# Patient Record
(demographics unavailable — no encounter records)

---

## 2025-01-13 NOTE — HISTORY OF PRESENT ILLNESS
[FreeTextEntry1] : 67 y.o F w/ PMHx of HTN, HLD, CAC score 38, atrial flutter, AF s/p ablation 11/2023 (on eliquis), obesity, presented to the office for follow up.  She reports feeling well since our last visit. No episodes of symptomatic afib or alerts noted on apple watch. Follows with EP Dr. Tran, still taking eliquis. Reports adherence to all meds. She denies exertional chest pain, dyspnea, syncope or presyncope. No orthopnea or PND. Has been working out.   ======================================================================================= July 2024 lipids  HDL 51 LDL 66 ; a1c 5.9% 12/2023: eGFR 95  07/2023: , HDL 50, LDL 75,  a1c 5.7% 9/12/22:   HDL 59 LDL 63 A1c 5.3% Cr 0.78 Lipid panel: June 2020- tot- 143, trig- 118, LDL- 65, HDL- 5, K- 3.4  April 2019 Lp(a) <10 nmol/L  10/2023 TTE - nl LV & RV size & fx, mild LVH, indeterminate LV diastolic fx, mild MR/TR, no pericardial effusion  05/19 CAC: 38 (LAD 6, LCx 32)  Stress Test: 11/9/17, Pt exercised for 9:01 mins on the Cornelius protocol from a HR of 75 bpm to a peak HR of 157 bpm, representing 98% of max predicted HR. BP oswald from 132/84 to 160/70 with a DWP of 25,120. Exercise capacity is 10 METS, which is excellent for age and gender. Baseline EKG showed NSR. There were no significant ST segment changes or arrhythmias with exercise. The EKG was negative for ischemia. At baseline the echo showed an EF of 60-65%. With exercise, there was normal augmentation in left ventricular systolic function. The stress echo was normal with no evidence of inducible ischemia.  Echo: 11/9/17, 1. Normal mitral valve. Minimal mitral regurgitation.2. Normal trileaflet aortic valve.3. Aortic Root: 3.1 cm. LVOT diameter: 2 cm.4. Normal left atrium. LA volume index = 29 cc/m2.5. Mild to moderate concentric left ventricular hypertrophy.6. Normal left ventricular systolic function, estimated LVEF of 60-65%. No segmental wall motion abnormalities.7. Normal diastolic function8. Normal right atrium.9. Normal right ventricular size and function.10. Normal tricuspid valve. Mild tricuspid regurgitation.11. Normal pulmonic valve.12. Normal pericardium with no pericardial effusion.

## 2025-01-13 NOTE — REASON FOR VISIT
[CV Risk Factors and Non-Cardiac Disease] : CV risk factors and non-cardiac disease [Hyperlipidemia] : hyperlipidemia [Hypertension] : hypertension [FreeTextEntry3] : Nathaly Cardenas

## 2025-01-13 NOTE — PHYSICAL EXAM
[Well Developed] : well developed [Well Nourished] : well nourished [No Acute Distress] : no acute distress [Normal Venous Pressure] : normal venous pressure [No Carotid Bruit] : no carotid bruit [Normal S1, S2] : normal S1, S2 [No Murmur] : no murmur [Clear Lung Fields] : clear lung fields [Good Air Entry] : good air entry [No Respiratory Distress] : no respiratory distress  [Soft] : abdomen soft [Non Tender] : non-tender [Normal Bowel Sounds] : normal bowel sounds [Normal Gait] : normal gait [Normal] : no edema, no cyanosis, no clubbing, no varicosities [Venous varicosities] : venous varicosities [No Skin Lesions] : no skin lesions [Moves all extremities] : moves all extremities [Alert and Oriented] : alert and oriented [Normal memory] : normal memory [de-identified] : irregularly, irregular rhythm

## 2025-01-13 NOTE — PHYSICAL EXAM
[Well Developed] : well developed [Well Nourished] : well nourished [No Acute Distress] : no acute distress [Normal Venous Pressure] : normal venous pressure [No Carotid Bruit] : no carotid bruit [Normal S1, S2] : normal S1, S2 [No Murmur] : no murmur [Clear Lung Fields] : clear lung fields [Good Air Entry] : good air entry [No Respiratory Distress] : no respiratory distress  [Soft] : abdomen soft [Non Tender] : non-tender [Normal Bowel Sounds] : normal bowel sounds [Normal Gait] : normal gait [Normal] : no edema, no cyanosis, no clubbing, no varicosities [Venous varicosities] : venous varicosities [No Skin Lesions] : no skin lesions [Moves all extremities] : moves all extremities [Alert and Oriented] : alert and oriented [Normal memory] : normal memory [de-identified] : irregularly, irregular rhythm

## 2025-01-13 NOTE — DISCUSSION/SUMMARY
[FreeTextEntry1] : #Coronary calcification #HLD #ASCVD Risk reduction CAC 38 in 2019. LDL at goal <70mg/dL. Lp(a) normal.  - continue Crestor 20 mg daily  #Aflutter/AFib s/p ablation in 2023. Follows w/ Dr. Tran, seeing him next month. - c/w eliquis 5mg BID  #HTN BP at goal in office.  - Continue olmesartan 5mg and juan 25mg - She will continue to monitor BP at home as well  - getting BMP with her internist  #Mild MR #Mild TR #LVH Last TTE in Oct 2023.  - next TTE Oct 2025  #Obesity  #Prediabetes Has been losing weight through lifestyle changes.  - encouraged patient to continue healthy exercise and eating habits, focusing on a mostly plant-based diet, Mediterranean style of eating and aiming for the recommended 150 minutes per week of moderate physical activity   RTC 6 months.  [EKG obtained to assist in diagnosis and management of assessed problem(s)] : EKG obtained to assist in diagnosis and management of assessed problem(s)

## 2025-03-01 NOTE — HISTORY OF PRESENT ILLNESS
[FreeTextEntry1] : Ms. Arriaga is a pleasant 67-year-old female with hypertension, hyperlipidemia and paroxysmal atrial flutter and atrial fibrillation s/p ablation 11/2023, who presents for follow up.  She initially presented complaining of infrequent palpitations, several times per year. She presented to the ED in the past with fluttering and was found to be in AF. She was started on Eliquis, which she has been compliant with.  She has not tolerated beta blockers or calcium channel blockers in the past due to fatigue and peripheral edema.  She hadn't been seen from 2019 to 2023 when she was found to be in afib.   She ultimately underwent an ablation of atrial fibrillation and atrial flutter 11/2023.  She has had no further palpitations though has felt some extra beats,.      She denies dizziness, syncope, chest pain, shortness of breath, orthopnea, PND, change in exercise tolerance or lower extremity edema. She is compliant with Eliquis without issues.
Female

## 2025-03-01 NOTE — PHYSICAL EXAM
[General Appearance - Well Developed] : well developed [Normal Appearance] : normal appearance [Well Groomed] : well groomed [General Appearance - Well Nourished] : well nourished [No Deformities] : no deformities [General Appearance - In No Acute Distress] : no acute distress [Normal Conjunctiva] : the conjunctiva exhibited no abnormalities [Normal Oral Mucosa] : normal oral mucosa [Normal Jugular Venous V Waves Present] : normal jugular venous V waves present [] : no respiratory distress [Respiration, Rhythm And Depth] : normal respiratory rhythm and effort [Exaggerated Use Of Accessory Muscles For Inspiration] : no accessory muscle use [Auscultation Breath Sounds / Voice Sounds] : lungs were clear to auscultation bilaterally [Normal Rate] : normal [Rhythm Regular] : regular [Normal S1] : normal S1 [Normal S2] : normal S2 [Click] : no click [Abnormal Walk] : normal gait [Skin Color & Pigmentation] : normal skin color and pigmentation [Oriented To Time, Place, And Person] : oriented to person, place, and time [Impaired Insight] : insight and judgment were intact [Affect] : the affect was normal [Mood] : the mood was normal [No Anxiety] : not feeling anxious [Distant] : the heart sounds were ~L not distant

## 2025-03-01 NOTE — REVIEW OF SYSTEMS
[Palpitations] : no palpitations [Negative] : Heme/Lymph [Fever] : no fever [Weight Gain (___ Lbs)] : no recent weight gain [Chills] : no chills [Feeling Fatigued] : not feeling fatigued [Weight Loss (___ Lbs)] : no recent weight loss [SOB] : no shortness of breath [Dyspnea on exertion] : not dyspnea during exertion [Chest Discomfort] : no chest discomfort [Lower Ext Edema] : no extremity edema [Syncope] : no syncope

## 2025-03-01 NOTE — REASON FOR VISIT
[Arrhythmia/ECG Abnorrmalities] : arrhythmia/ECG abnormalities [Follow-Up - Clinic] : a clinic follow-up of [FreeTextEntry1] : atrial flutter

## 2025-03-01 NOTE — CARDIOLOGY SUMMARY
[No Ischemia] : no Ischemia [No Exercise Ind Arr] : no exercise induced arrhythmias [___] : [unfilled] [LVEF ___%] : LVEF [unfilled]% [Normal] : normal LA size [None] : no mitral regurgitation [de-identified] : 10/19/2023:  bpm 12/7/2023 sinus at 86bpm 2/8/24 sinus at 90bpm 8/8/24 sinus a 77bpm 2/26/25 sinus at 71bpm

## 2025-03-01 NOTE — ADDENDUM
[FreeTextEntry1] : I, Philip Tran, hereby attest that the medical record entry for this patient accurately reflects signatures/notations that I made on the Date of Service in my capacity as an Attending Physician when I treated/diagnosed the above patient. I do hereby attest that this information is true, accurate and complete to the best of my knowledge and I understand that any falsification, omission, or concealment of material fact may subject me to administrative, civil, or, criminal liability. I agree with the note as written by my PA in its entirety. I was present for the entire visit and supervised the entire visit and agree with the plan as outlined.  I, Shaw Flores, am scribing for and the presence of Dr. Tran the following sections: HPI, PMH,Family/social history, ROS, Physical Exam, Assessment / Plan.

## 2025-03-01 NOTE — DISCUSSION/SUMMARY
[EKG obtained to assist in diagnosis and management of assessed problem(s)] : EKG obtained to assist in diagnosis and management of assessed problem(s) [FreeTextEntry1] :  Ms. Arriaga is a pleasant 67-year-old female with hypertension, hyperlipidemia and paroxysmal atrial flutter and atrial fibrillation s/p ablation 11/2023, who presents for follow up.  She is in normal sinus rhythm today with no symptoms concerning for recurrent sustained arrhythmia.   We discussed her CHADSVASC score is at least 3 and the data shows she should be on a NOAC long term.  We discussed long term the option of monitoring with a loop recorder or wearable technology if she decides she wants to stop oral anticoagulation but at this time she will continue Eliquis.  Follow up in 6 months or sooner if needed.  She knows to call with any questions or concerns.

## 2025-03-01 NOTE — CARDIOLOGY SUMMARY
Patient Seen in: [facility] Emergency Department    History   Patient presents with:  Fever (infectious)  Urinary Symptoms (urologic)    Stated Complaint: fever, urine frequency, catheter removed yesterday    HPI    70-year-old male presents emergen HPI.  Constitutional and vital signs reviewed. All other systems reviewed and negative except as noted above.     Physical Exam     ED Triage Vitals [11/14/18 1955]   /77   Pulse 80   Resp 18   Temp 97.8 °F (36.6 °C)   Temp src Oral   SpO2 97 % [No Ischemia] : no Ischemia HGB 12.6 (*)     Neutrophil Absolute Prelim 10.89 (*)     Neutrophil Absolute 10.89 (*)     Monocyte Absolute 1.12 (*)     All other components within normal limits   LACTIC ACID, PLASMA - Normal   CBC WITH DIFFERENTIAL WITH PLATELET    Narrative:      The [No Exercise Ind Arr] : no exercise induced arrhythmias [___] : [unfilled] [LVEF ___%] : LVEF [unfilled]% [Normal] : normal LA size [None] : no mitral regurgitation [de-identified] : 10/19/2023:  bpm 12/7/2023 sinus at 86bpm 2/8/24 sinus at 90bpm 8/8/24 sinus a 77bpm 2/26/25 sinus at 71bpm

## 2025-03-01 NOTE — CARDIOLOGY SUMMARY
"  Problem: Pain  Goal: Patient's pain/discomfort is manageable  Outcome: Progressing     Problem: Daily Care  Goal: Daily care needs are met  Outcome: Progressing   Pain managed with medication. Weak, but attempts to aid in own ADLs. Sclera very red, slept less than 1 hour during this night. Anxious also calling very frequently and expressing loneliness. PRN vistaril x1 before bed but not much effect, declined further PRN and expressed anxiety about medications \"messing up my kidneys more\". Urine output still dark savita but lighter than earlier this shift.   " [No Ischemia] : no Ischemia [No Exercise Ind Arr] : no exercise induced arrhythmias [___] : [unfilled] [LVEF ___%] : LVEF [unfilled]% [Normal] : normal LA size [None] : no mitral regurgitation [de-identified] : 10/19/2023:  bpm 12/7/2023 sinus at 86bpm 2/8/24 sinus at 90bpm 8/8/24 sinus a 77bpm 2/26/25 sinus at 71bpm

## 2025-04-03 NOTE — REASON FOR VISIT
[CV Risk Factors and Non-Cardiac Disease] : CV risk factors and non-cardiac disease [Hyperlipidemia] : hyperlipidemia [Hypertension] : hypertension [FreeTextEntry3] : Dr. iL Rojas (fax [phone] attn: Arvind

## 2025-04-03 NOTE — HISTORY OF PRESENT ILLNESS
[FreeTextEntry1] : 67 y.o F w/ PMHx of HTN, HLD, CAC score 38, atrial flutter, AF s/p ablation 11/2023 (on eliquis), obesity, presented to the office for follow up.  She reports feeling well since our last visit. No episodes of symptomatic afib or alerts noted on apple watch. Follows with EP Dr. Tran, still taking eliquis. Reports adherence to all meds. She denies exertional chest pain, dyspnea, syncope or presyncope. No orthopnea or PND. Walks 3-5 miles/day. Reports she is having trouble losing weight. She is going for parathyroid surgery with Dr. Li Rojas at [place].  ======================================================================================= July 2024 lipids  HDL 51 LDL 66 ; a1c 5.9% 12/2023: eGFR 95  07/2023: , HDL 50, LDL 75,  a1c 5.7% 9/12/22:   HDL 59 LDL 63 A1c 5.3% Cr 0.78 Lipid panel: June 2020- tot- 143, trig- 118, LDL- 65, HDL- 5, K- 3.4  April 2019 Lp(a) <10 nmol/L  10/2023 TTE - nl LV & RV size & fx, mild LVH, indeterminate LV diastolic fx, mild MR/TR, no pericardial effusion  05/19 CAC: 38 (LAD 6, LCx 32)  Stress Test: 11/9/17, Pt exercised for 9:01 mins on the Cornelius protocol from a HR of 75 bpm to a peak HR of 157 bpm, representing 98% of max predicted HR. BP oswald from 132/84 to 160/70 with a DWP of 25,120. Exercise capacity is 10 METS, which is excellent for age and gender. Baseline EKG showed NSR. There were no significant ST segment changes or arrhythmias with exercise. The EKG was negative for ischemia. At baseline the echo showed an EF of 60-65%. With exercise, there was normal augmentation in left ventricular systolic function. The stress echo was normal with no evidence of inducible ischemia.  Echo: 11/9/17, 1. Normal mitral valve. Minimal mitral regurgitation.2. Normal trileaflet aortic valve.3. Aortic Root: 3.1 cm. LVOT diameter: 2 cm.4. Normal left atrium. LA volume index = 29 cc/m2.5. Mild to moderate concentric left ventricular hypertrophy.6. Normal left ventricular systolic function, estimated LVEF of 60-65%. No segmental wall motion abnormalities.7. Normal diastolic function8. Normal right atrium.9. Normal right ventricular size and function.10. Normal tricuspid valve. Mild tricuspid regurgitation.11. Normal pulmonic valve.12. Normal pericardium with no pericardial effusion.

## 2025-04-03 NOTE — PHYSICAL EXAM
[Well Developed] : well developed [Well Nourished] : well nourished [No Acute Distress] : no acute distress [Normal Venous Pressure] : normal venous pressure [No Carotid Bruit] : no carotid bruit [Normal S1, S2] : normal S1, S2 [No Murmur] : no murmur [Clear Lung Fields] : clear lung fields [Good Air Entry] : good air entry [No Respiratory Distress] : no respiratory distress  [Soft] : abdomen soft [Non Tender] : non-tender [Normal Bowel Sounds] : normal bowel sounds [Normal Gait] : normal gait [Normal] : no edema, no cyanosis, no clubbing, no varicosities [Venous varicosities] : venous varicosities [No Skin Lesions] : no skin lesions [Moves all extremities] : moves all extremities [Alert and Oriented] : alert and oriented [Normal memory] : normal memory [de-identified] : irregularly, irregular rhythm

## 2025-04-03 NOTE — REASON FOR VISIT
[CV Risk Factors and Non-Cardiac Disease] : CV risk factors and non-cardiac disease [Hyperlipidemia] : hyperlipidemia [Hypertension] : hypertension [FreeTextEntry3] : Dr. Li Rojas (fax [phone] attn: Arvind

## 2025-04-03 NOTE — HISTORY OF PRESENT ILLNESS
[FreeTextEntry1] : 67 y.o F w/ PMHx of HTN, HLD, CAC score 38, atrial flutter, AF s/p ablation 11/2023 (on eliquis), obesity, presented to the office for follow up.  She reports feeling well since our last visit. No episodes of symptomatic afib or alerts noted on apple watch. Follows with EP Dr. Tran, still taking eliquis. Reports adherence to all meds. She denies exertional chest pain, dyspnea, syncope or presyncope. No orthopnea or PND. Walks 3-5 miles/day. Reports she is having trouble losing weight. She is going for parathyroid surgery with Dr. Li Rojas at [place].  ======================================================================================= July 2024 lipids  HDL 51 LDL 66 ; a1c 5.9% 12/2023: eGFR 95  07/2023: , HDL 50, LDL 75,  a1c 5.7% 9/12/22:   HDL 59 LDL 63 A1c 5.3% Cr 0.78 Lipid panel: June 2020- tot- 143, trig- 118, LDL- 65, HDL- 5, K- 3.4  April 2019 Lp(a) <10 nmol/L  10/2023 TTE - nl LV & RV size & fx, mild LVH, indeterminate LV diastolic fx, mild MR/TR, no pericardial effusion  05/19 CAC: 38 (LAD 6, LCx 32)  Stress Test: 11/9/17, Pt exercised for 9:01 mins on the Cornelius protocol from a HR of 75 bpm to a peak HR of 157 bpm, representing 98% of max predicted HR. BP oswald from 132/84 to 160/70 with a DWP of 25,120. Exercise capacity is 10 METS, which is excellent for age and gender. Baseline EKG showed NSR. There were no significant ST segment changes or arrhythmias with exercise. The EKG was negative for ischemia. At baseline the echo showed an EF of 60-65%. With exercise, there was normal augmentation in left ventricular systolic function. The stress echo was normal with no evidence of inducible ischemia.  Echo: 11/9/17, 1. Normal mitral valve. Minimal mitral regurgitation.2. Normal trileaflet aortic valve.3. Aortic Root: 3.1 cm. LVOT diameter: 2 cm.4. Normal left atrium. LA volume index = 29 cc/m2.5. Mild to moderate concentric left ventricular hypertrophy.6. Normal left ventricular systolic function, estimated LVEF of 60-65%. No segmental wall motion abnormalities.7. Normal diastolic function8. Normal right atrium.9. Normal right ventricular size and function.10. Normal tricuspid valve. Mild tricuspid regurgitation.11. Normal pulmonic valve.12. Normal pericardium with no pericardial effusion.

## 2025-04-03 NOTE — DISCUSSION/SUMMARY
[FreeTextEntry1] : 67 y.o F w/ PMHx of HTN, HLD, CAC score 38, atrial flutter, AF s/p ablation 11/2023 (on eliquis), obesity, presented to the office for follow up.  #Preoperative risk evaluation  RCRI 0 points, METS >4. EKG performed in office.  - From a cardiovascular standpoint, she is optimized to proceed to surgery and no further testing is indicated - okay to hold eliquis for 72 hours prior to surgery  #Coronary calcification #HLD #ASCVD Risk reduction CAC 38 in 2019. LDL at goal <70mg/dL. Lp(a) normal.  - continue Crestor 20 mg daily  #Aflutter/AFib s/p ablation in 2023. Follows w/ Dr. Tran, seeing him next month. - c/w eliquis 5mg BID  #HTN BP at goal in office. Olmesartan discontinued due to episodes of low BP.  - Continue juan 25mg - She will continue to monitor BP at home as well   #Mild MR #Mild TR #LVH Last TTE in Oct 2023.  - next TTE Oct 2025  #Obesity  #Prediabetes - encouraged patient to continue healthy exercise and eating habits, focusing on a mostly plant-based diet, Mediterranean style of eating and aiming for the recommended 150 minutes per week of moderate physical activity   RTC 6 months.  [EKG obtained to assist in diagnosis and management of assessed problem(s)] : EKG obtained to assist in diagnosis and management of assessed problem(s)

## 2025-04-03 NOTE — PHYSICAL EXAM
[Well Developed] : well developed [Well Nourished] : well nourished [No Acute Distress] : no acute distress [Normal Venous Pressure] : normal venous pressure [No Carotid Bruit] : no carotid bruit [Normal S1, S2] : normal S1, S2 [No Murmur] : no murmur [Clear Lung Fields] : clear lung fields [Good Air Entry] : good air entry [No Respiratory Distress] : no respiratory distress  [Soft] : abdomen soft [Non Tender] : non-tender [Normal Bowel Sounds] : normal bowel sounds [Normal Gait] : normal gait [Normal] : no edema, no cyanosis, no clubbing, no varicosities [Venous varicosities] : venous varicosities [No Skin Lesions] : no skin lesions [Moves all extremities] : moves all extremities [Alert and Oriented] : alert and oriented [Normal memory] : normal memory [de-identified] : irregularly, irregular rhythm